# Patient Record
Sex: FEMALE | Race: WHITE | URBAN - METROPOLITAN AREA
[De-identification: names, ages, dates, MRNs, and addresses within clinical notes are randomized per-mention and may not be internally consistent; named-entity substitution may affect disease eponyms.]

---

## 2021-01-01 ENCOUNTER — HOSPITAL ENCOUNTER (EMERGENCY)
Age: 41
End: 2021-10-10
Attending: EMERGENCY MEDICINE
Payer: OTHER MISCELLANEOUS

## 2021-01-01 DIAGNOSIS — I46.8 TRAUMATIC CARDIAC ARREST (HCC): Primary | ICD-10-CM

## 2021-01-01 PROCEDURE — 6810039000 HC L1 TRAUMA ALERT

## 2021-01-01 PROCEDURE — 99283 EMERGENCY DEPT VISIT LOW MDM: CPT
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
## 2021-10-10 NOTE — ED NOTES
Bed: 44  Expected date:   Expected time:   Means of arrival:   Comments:     Dewayne Cordero RN  10/10/21 0121
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
